# Patient Record
Sex: FEMALE | Race: WHITE | NOT HISPANIC OR LATINO | Employment: FULL TIME | URBAN - NONMETROPOLITAN AREA
[De-identification: names, ages, dates, MRNs, and addresses within clinical notes are randomized per-mention and may not be internally consistent; named-entity substitution may affect disease eponyms.]

---

## 2018-01-16 ENCOUNTER — HISTORY (OUTPATIENT)
Dept: FAMILY MEDICINE | Facility: OTHER | Age: 33
End: 2018-01-16

## 2018-01-16 ENCOUNTER — OFFICE VISIT - GICH (OUTPATIENT)
Dept: FAMILY MEDICINE | Facility: OTHER | Age: 33
End: 2018-01-16

## 2018-01-16 ENCOUNTER — HOSPITAL ENCOUNTER (OUTPATIENT)
Dept: RADIOLOGY | Facility: OTHER | Age: 33
End: 2018-01-16
Attending: NURSE PRACTITIONER

## 2018-01-16 DIAGNOSIS — M79.675 PAIN OF TOE OF LEFT FOOT: ICD-10-CM

## 2018-01-16 DIAGNOSIS — K21.9 GASTRO-ESOPHAGEAL REFLUX DISEASE WITHOUT ESOPHAGITIS: ICD-10-CM

## 2018-01-16 DIAGNOSIS — S99.922A INJURY OF LEFT FOOT: ICD-10-CM

## 2018-01-16 DIAGNOSIS — S92.515A CLOSED NONDISPLACED FRACTURE OF PROXIMAL PHALANX OF LESSER TOE OF LEFT FOOT: ICD-10-CM

## 2018-01-16 DIAGNOSIS — M79.672 PAIN OF LEFT FOOT: ICD-10-CM

## 2018-01-17 ENCOUNTER — AMBULATORY - GICH (OUTPATIENT)
Dept: SCHEDULING | Facility: OTHER | Age: 33
End: 2018-01-17

## 2018-01-26 ENCOUNTER — AMBULATORY - GICH (OUTPATIENT)
Dept: SCHEDULING | Facility: OTHER | Age: 33
End: 2018-01-26

## 2018-01-30 ENCOUNTER — DOCUMENTATION ONLY (OUTPATIENT)
Dept: FAMILY MEDICINE | Facility: OTHER | Age: 33
End: 2018-01-30

## 2018-01-30 PROBLEM — R87.619 ABNORMAL GLANDULAR PAPANICOLAOU SMEAR OF CERVIX: Status: ACTIVE | Noted: 2018-01-30

## 2018-01-30 PROBLEM — F17.200 NICOTINE ADDICTION: Status: ACTIVE | Noted: 2018-01-30

## 2018-01-30 RX ORDER — FLUCONAZOLE 150 MG/1
TABLET ORAL
COMMUNITY
Start: 2017-08-09 | End: 2018-03-15

## 2018-01-30 RX ORDER — LANSOPRAZOLE 30 MG/1
30 CAPSULE, DELAYED RELEASE ORAL DAILY
COMMUNITY
Start: 2017-07-31 | End: 2018-06-11

## 2018-01-30 RX ORDER — HYDROCODONE BITARTRATE AND ACETAMINOPHEN 5; 325 MG/1; MG/1
1 TABLET ORAL EVERY 4 HOURS PRN
COMMUNITY
Start: 2016-07-12 | End: 2018-03-15

## 2018-02-09 VITALS — DIASTOLIC BLOOD PRESSURE: 82 MMHG | HEART RATE: 64 BPM | SYSTOLIC BLOOD PRESSURE: 128 MMHG | WEIGHT: 172.4 LBS

## 2018-02-13 NOTE — PROGRESS NOTES
Patient Information     Patient Name MRN Sex Hansa Corrigan [id number] Female 1985      Progress Notes by Sybil Carolina NP at 2018  2:30 PM     Author:  Sybil Carolina NP Service:  (none) Author Type:  PHYS- Nurse Practitioner     Filed:  2018  4:16 PM Encounter Date:  2018 Status:  Signed     :  Sybil Carolina NP (PHYS- Nurse Practitioner)            HPI:    Hansa Dennis is a 32 y.o. female who presents to clinic today for foot injury.   States she stubbed her left toes yesterday.  States no bruising or swelling.  Pain persisting and radiating to her left foot and heel.  Mild numbness between the left 4th and 5th toes and the heel.  Worse with wearing shoes.  Pain worst with weight bearing and walking.  Taking Ibuprofen.  No icing.      She is also requesting a refill on her Prevacid.   States she tried OTC lansoprazole and Zantac without relief.  States GERD symptoms bothersome for the past week.  States foul taste in mouth, bad breath, and burning in chest.  States history of colitis with diarrhea, abdominal cramping and blood in stools, reports colonoscopy 3 years ago was normal.  States she occasionally still has symptoms flare for about 2-3 days with diarrhea, maroon colored blood in stools and intestinal cramping.  She is going to follow up with her PCP for repeat colonoscopy and evaluation of her colitis symptoms.          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       Social History       Substance Use Topics         Smoking  status:  Former Smoker      Packs/day: 0.50      Years: 10.00      Types: Cigarettes      Start date: 7/12/1998      Quit date: 6/12/2011      Smokeless tobacco:  Never Used      Alcohol use  0.0 oz/week     0 Standard drinks or equivalent per week      Current Outpatient Prescriptions       Medication  Sig Dispense Refill     fluconazole (DIFLUCAN) 150 mg tablet 150mg weekly x 6 weeks 6 tablet 0     HYDROcodone-acetaminophen, 5-325 mg, (NORCO) per tablet Take 1 tablet by mouth every 4 hours if needed. Max acetaminophen dose: 4000 mg in 24 hrs. 90 tablet 0     lansoprazole (PREVACID) 30 mg capsule Take 1 capsule by mouth once daily. 90 capsule 0     No current facility-administered medications for this visit.      Medications have been reviewed by me and are current to the best of my knowledge and ability.    No Known Allergies    Past medical history, past surgical history, current medications and allergies reviewed and accurate to the best of my knowledge.        ROS:  Refer to HPI    /82 (Cuff Site: Right Arm, Position: Sitting, Cuff Size: Adult Regular)  Pulse 64  Wt 78.2 kg (172 lb 6.4 oz)  LMP 01/02/2018  Breastfeeding? No    EXAM:  General Appearance: Well appearing adult female, appropriate appearance for age. No acute distress  Eyes: conjunctivae normal without erythema or irritation, no drainage or crusting, no eyelid swelling, pupils equal   Orophayrnx: moist mucous membranes, posterior pharynx with erythema, tonsils without hypertrophy, no erythema, no exudates or petechiae, no post nasal drip seen, no ulcers.    Neck: supple without adenopathy  Respiratory:  Normal effort.  No cough appreciated.  Cardiovascular:  CMS intact to left lower extremity, brisk capillary refill, strong pedal pulse   Musculoskeletal:  Left toes, foot, and ankle without visible swelling or deformity.  Left fifth toe with generalized tenderness to palpation.  Able to wiggle and move the left toes.  Left plantar foot and  heel with stated pain but non tender to palpation.  No tenderness over the left achilles.  No tenderness of the left ankle.  Normal gait.  Equal movement of bilateral upper extremities.  Equal movement of bilateral lower extremities.   Dermatological: skin intact to left lower extremity, no bruising, no petechiae, no erythema, no rash  Psychological: normal affect, alert and pleasant      Xray:  PROCEDURE:  XR TOES 3 VIEWS LEFT  HISTORY: Toe injury, left, initial encounter.  COMPARISON:  None.  TECHNIQUE:  3 views left fifth digit.  FINDINGS:  There is mild focal cortical irregularity within the proximal aspect of the proximal phalanx of the left fifth toe. A nondisplaced fracture in this location is not excluded. No other evidence of fracture is seen. No retained foreign body is present. Consider follow-up.  Electronically Signed By: Jose Lamar on 1/16/2018 3:49 PM          ASSESSMENT/PLAN:    ICD-10-CM    1. Closed nondisplaced fracture of proximal phalanx of lesser toe of left foot, initial encounter S92.515A    2. Toe injury, left, initial encounter S99.922A XR TOES 3 VIEWS LEFT   3. Acute pain of left foot M79.672 XR TOES 3 VIEWS LEFT   4. Toe pain, left M79.675 XR TOES 3 VIEWS LEFT   5. Gastroesophageal reflux disease, esophagitis presence not specified K21.9 lansoprazole (PREVACID) 30 mg capsule         Left toe pain, injury:  Xray of left fifth toe completed and reviewed, slight irregularity of the proximal phalanx, radiologist over read:  There is mild focal cortical irregularity within the proximal aspect of the proximal phalanx of the left fifth toe. A nondisplaced fracture in this location is not excluded. No other evidence of fracture is seen.   Encouraged reji tapping and hard soled shoes  WBAT  Activity as tolerated  ICE  Tylenol or ibuprofen PRN  Follow up for recheck in 2-4 weeks     GERD symptoms:  History of GERD and colitis symptoms, reports previous colonoscopy normal, due for repeat and to  see her PCP for check up but requesting refill today of her Prevacid.  Refilled Prevacid 30 mg daily (90, no refills).  Discussed need to follow up soon with her PCP for recheck of her GERD and colitis symptoms and management            Patient Instructions      Index Maltese   Plantar Fasciitis Exercises   Your healthcare provider may recommend exercises to help you heal. Talk to your healthcare provider or physical therapist about which exercises will best help you and how to do them correctly and safely. An over-the-counter shoe orthotic might also be helpful. You can buy these at an outdoor recreation store, shoe store, or large department store.  You may start strengthening the muscles of your hip and stretching the muscles of your foot right away.    Prone hip extension: Lie on your stomach with your legs straight out behind you. Fold your arms under your head and rest your head on your arms. Draw your belly button in towards your spine and tighten your abdominal muscles. Tighten the buttocks and thigh muscles of the leg on your injured side and lift the leg off the floor about 8 inches. Keep your leg straight. Hold for 5 seconds. Then lower your leg and relax. Do 2 sets of 15.    Side-lying leg lift: Lie on your uninjured side. Tighten the front thigh muscles on your injured leg and lift that leg 8 to 10 inches (20 to 25 centimeters) away from the other leg. Keep the leg straight and lower it slowly. Do 2 sets of 15.    Frozen can roll: Roll your bare injured foot back and forth from your heel to your mid-arch over a frozen juice can. Repeat for 3 to 5 minutes. This exercise is particularly helpful if it is done first thing in the morning.    Towel stretch: Sit on a hard surface with your injured leg stretched out in front of you. Loop a towel around your toes and the ball of your foot and pull the towel toward your body keeping your leg straight. Hold this position for 15 to 30 seconds and then relax. Repeat  3 times.    Standing calf stretch: Stand facing a wall with your hands on the wall at about eye level. Keep your injured leg back with your heel on the floor. Keep the other leg forward with the knee bent. Turn your back foot slightly inward (as if you were pigeon-toed). Slowly lean into the wall until you feel a stretch in the back of your calf. Hold the stretch for 15 to 30 seconds. Return to the starting position. Repeat 3 times. Do this exercise several times each day.    Seated plantar fascia stretch: Sit in a chair and cross the injured foot over the knee of your other leg. Place your fingers over the base of your toes and pull them back toward your shin until you feel a comfortable stretch in the arch of your foot. Hold 15 seconds and repeat 3 times.    Plantar fascia massage: Sit in a chair and cross the injured foot over the knee of your other leg. Place your fingers over the base of the toes of your injured foot and pull your toes toward your shin until you feel a stretch in the arch of your foot. With your other hand, massage the bottom of your foot, moving from the heel toward your toes. Do this for 3 to 5 minutes. Start gently. Press harder on the bottom of your foot as you become able to tolerate more pressure.    Achilles stretch: Stand with the ball of one foot on a stair. Reach for the step below with your heel until you feel a stretch in the arch of your foot. Hold this position for 15 to 30 seconds and then relax. Repeat 3 times.    Standing hamstring stretch: Put the heel of the leg on your injured side on a stool about 15 inches high. Keep your leg straight. Lean forward, bending at the hips, until you feel a mild stretch in the back of your thigh. Make sure you don't roll your shoulders or bend at the waist when doing this or you will stretch your lower back instead of your leg. Hold the stretch for 15 to 30 seconds. Repeat 3 times.  After you have stretched the bottom muscles of your foot, you  can do these exercises to start strengthening the muscles of your foot.    Towel pickup: With your heel on the ground,  a towel with your toes. Release. Do 3 sets of 20. When this gets easy, add more resistance by placing a book or small weight on the towel.    Balance and reach exercises: Stand next to a chair with your injured leg farther from the chair. The chair will provide support if you need it. Stand on the foot of your injured leg and bend your knee slightly.  1. With the hand that is farther away from the chair, reach forward in front of you by bending at the waist. Avoid bending your knee any more as you do this. Repeat this 15 times. To make the exercise more challenging, reach farther in front of you. Do 2 sets of 15.  2. Reach the hand that is farther away from the chair across your body toward the chair. The farther you reach, the more challenging the exercise. Do 2 sets of 15.    Heel raise: Stand behind a chair or counter with both feet flat on the floor. Using the chair or counter as a support, rise up onto your toes and hold for 5 seconds. Then slowly lower yourself down without holding onto the support. (It's OK to keep holding onto the support if you need to.) When this exercise becomes less painful, try doing this exercise while you are standing on the injured leg only. Repeat 15 times. Do 2 sets of 15. Rest 30 seconds between sets.  Developed by [employer].  Adult Advisor 2017.2 published by [employer].  Last modified: 2016-05-25  Last reviewed: 2016-05-25  This content is reviewed periodically and is subject to change as new health information becomes available. The information is intended to inform and educate and is not a replacement for medical evaluation, advice, diagnosis or treatment by a healthcare professional.  References   Adult Advisor 2017.2 Index    Copyright   2017 [employer], a division of [employer] All rights reserved.

## 2018-02-13 NOTE — PATIENT INSTRUCTIONS
Patient Information     Patient Name MRN Sex Hansa Corrigan [id number] Female 1985      Patient Instructions by Sybil Carolina NP at 2018  3:35 PM     Author:  Sybil Carolina NP  Service:  (none) Author Type:  PHYS- Nurse Practitioner     Filed:  2018  3:35 PM  Encounter Date:  2018 Status:  Addendum     :  Sybil Carolina NP (PHYS- Nurse Practitioner)        Related Notes: Original Note by Sybil Carolina NP (PHYS- Nurse Practitioner) filed at 2018  3:35 PM               Index Croatian   Plantar Fasciitis Exercises   Your healthcare provider may recommend exercises to help you heal. Talk to your healthcare provider or physical therapist about which exercises will best help you and how to do them correctly and safely. An over-the-counter shoe orthotic might also be helpful. You can buy these at an outdoor recreation store, shoe store, or large department store.  You may start strengthening the muscles of your hip and stretching the muscles of your foot right away.    Prone hip extension: Lie on your stomach with your legs straight out behind you. Fold your arms under your head and rest your head on your arms. Draw your belly button in towards your spine and tighten your abdominal muscles. Tighten the buttocks and thigh muscles of the leg on your injured side and lift the leg off the floor about 8 inches. Keep your leg straight. Hold for 5 seconds. Then lower your leg and relax. Do 2 sets of 15.    Side-lying leg lift: Lie on your uninjured side. Tighten the front thigh muscles on your injured leg and lift that leg 8 to 10 inches (20 to 25 centimeters) away from the other leg. Keep the leg straight and lower it slowly. Do 2 sets of 15.    Frozen can roll: Roll your bare injured foot back and forth from your heel to your mid-arch over a frozen juice can. Repeat for 3 to 5 minutes. This exercise is particularly helpful if it is done first thing in the  morning.    Towel stretch: Sit on a hard surface with your injured leg stretched out in front of you. Loop a towel around your toes and the ball of your foot and pull the towel toward your body keeping your leg straight. Hold this position for 15 to 30 seconds and then relax. Repeat 3 times.    Standing calf stretch: Stand facing a wall with your hands on the wall at about eye level. Keep your injured leg back with your heel on the floor. Keep the other leg forward with the knee bent. Turn your back foot slightly inward (as if you were pigeon-toed). Slowly lean into the wall until you feel a stretch in the back of your calf. Hold the stretch for 15 to 30 seconds. Return to the starting position. Repeat 3 times. Do this exercise several times each day.    Seated plantar fascia stretch: Sit in a chair and cross the injured foot over the knee of your other leg. Place your fingers over the base of your toes and pull them back toward your shin until you feel a comfortable stretch in the arch of your foot. Hold 15 seconds and repeat 3 times.    Plantar fascia massage: Sit in a chair and cross the injured foot over the knee of your other leg. Place your fingers over the base of the toes of your injured foot and pull your toes toward your shin until you feel a stretch in the arch of your foot. With your other hand, massage the bottom of your foot, moving from the heel toward your toes. Do this for 3 to 5 minutes. Start gently. Press harder on the bottom of your foot as you become able to tolerate more pressure.    Achilles stretch: Stand with the ball of one foot on a stair. Reach for the step below with your heel until you feel a stretch in the arch of your foot. Hold this position for 15 to 30 seconds and then relax. Repeat 3 times.    Standing hamstring stretch: Put the heel of the leg on your injured side on a stool about 15 inches high. Keep your leg straight. Lean forward, bending at the hips, until you feel a mild  stretch in the back of your thigh. Make sure you don't roll your shoulders or bend at the waist when doing this or you will stretch your lower back instead of your leg. Hold the stretch for 15 to 30 seconds. Repeat 3 times.  After you have stretched the bottom muscles of your foot, you can do these exercises to start strengthening the muscles of your foot.    Towel pickup: With your heel on the ground,  a towel with your toes. Release. Do 3 sets of 20. When this gets easy, add more resistance by placing a book or small weight on the towel.    Balance and reach exercises: Stand next to a chair with your injured leg farther from the chair. The chair will provide support if you need it. Stand on the foot of your injured leg and bend your knee slightly.  1. With the hand that is farther away from the chair, reach forward in front of you by bending at the waist. Avoid bending your knee any more as you do this. Repeat this 15 times. To make the exercise more challenging, reach farther in front of you. Do 2 sets of 15.  2. Reach the hand that is farther away from the chair across your body toward the chair. The farther you reach, the more challenging the exercise. Do 2 sets of 15.    Heel raise: Stand behind a chair or counter with both feet flat on the floor. Using the chair or counter as a support, rise up onto your toes and hold for 5 seconds. Then slowly lower yourself down without holding onto the support. (It's OK to keep holding onto the support if you need to.) When this exercise becomes less painful, try doing this exercise while you are standing on the injured leg only. Repeat 15 times. Do 2 sets of 15. Rest 30 seconds between sets.  Developed by [employer].  Adult Advisor 2017.2 published by [employer].  Last modified: 2016-05-25  Last reviewed: 2016-05-25  This content is reviewed periodically and is subject to change as new health information becomes available. The information is intended to inform  and educate and is not a replacement for medical evaluation, advice, diagnosis or treatment by a healthcare professional.  References   Adult Advisor 2017.2 Index    Copyright   2017 [employer], a division of [employer] All rights reserved.

## 2018-02-13 NOTE — NURSING NOTE
Patient Information     Patient Name MRN Sex Hansa Corrigan [id number] Female 1985      Nursing Note by Geraldine Shetty at 2018  2:30 PM     Author:  Geraldine Shetty Service:  (none) Author Type:  (none)     Filed:  2018  2:57 PM Encounter Date:  2018 Status:  Signed     :  Geraldine Shetty            Patient presents today for left foot injury. She accidental hit her foot on the underneath of her bed yesterday. Foot didn't swell up or bruise, but it is really painful.     Geraldine Shetty LPRICHIE..............2018 2:52 PM

## 2018-03-15 ENCOUNTER — OFFICE VISIT (OUTPATIENT)
Dept: FAMILY MEDICINE | Facility: OTHER | Age: 33
End: 2018-03-15
Attending: NURSE PRACTITIONER
Payer: COMMERCIAL

## 2018-03-15 VITALS
TEMPERATURE: 98.2 F | SYSTOLIC BLOOD PRESSURE: 124 MMHG | WEIGHT: 170 LBS | DIASTOLIC BLOOD PRESSURE: 88 MMHG | HEART RATE: 64 BPM

## 2018-03-15 DIAGNOSIS — Z12.4 CERVICAL CANCER SCREENING: ICD-10-CM

## 2018-03-15 DIAGNOSIS — J34.89 LESION OF NOSE: ICD-10-CM

## 2018-03-15 DIAGNOSIS — R10.2 PELVIC PAIN IN FEMALE: Primary | ICD-10-CM

## 2018-03-15 DIAGNOSIS — Z00.00 ROUTINE GENERAL MEDICAL EXAMINATION AT A HEALTH CARE FACILITY: ICD-10-CM

## 2018-03-15 DIAGNOSIS — Z80.41 FAMILY HISTORY OF MALIGNANT NEOPLASM OF OVARY: ICD-10-CM

## 2018-03-15 DIAGNOSIS — Z80.8 FAMILY HISTORY OF SKIN CANCER: ICD-10-CM

## 2018-03-15 DIAGNOSIS — Z87.42 HISTORY OF OVARIAN CYST: ICD-10-CM

## 2018-03-15 PROCEDURE — 88142 CYTOPATH C/V THIN LAYER: CPT | Mod: ZL | Performed by: NURSE PRACTITIONER

## 2018-03-15 PROCEDURE — G0123 SCREEN CERV/VAG THIN LAYER: HCPCS | Performed by: NURSE PRACTITIONER

## 2018-03-15 PROCEDURE — 99395 PREV VISIT EST AGE 18-39: CPT | Mod: 25 | Performed by: NURSE PRACTITIONER

## 2018-03-15 PROCEDURE — 87624 HPV HI-RISK TYP POOLED RSLT: CPT | Performed by: NURSE PRACTITIONER

## 2018-03-15 ASSESSMENT — PAIN SCALES - GENERAL: PAINLEVEL: NO PAIN (0)

## 2018-03-15 ASSESSMENT — ENCOUNTER SYMPTOMS
ABDOMINAL PAIN: 1
ABDOMINAL DISTENTION: 1

## 2018-03-15 NOTE — PATIENT INSTRUCTIONS
Recognizing Skin Cancer  Doing monthly skin checkups is the best way to find new marks or skin changes. During your skin checkups, be sure to follow the ABCDEs of skin checks. This means checking moles or other growths for Asymmetry, Border, Color, Diameter, and Evolving (changing). Note, too, any new growths, or, if any of your growths bleed, itch, look different, or are painful.  The ABCDEs of skin checks  Check your moles or growths for signs of melanoma using ABCDE:    Asymmetry: the sides of the mole or growth don t match    Border: the edges are ragged, notched, or blurred    Color: the color within the mole or growth varies    Diameter: the mole or growth is larger than 6 mm (size of a pencil eraser)    Evolving: the size, shape, or color of the mole or growth is changing (evolving is not shown below)       In addition to the ABCDEs, other warning signs of skin cancer include:    A spot or mole that looks different from all other marks on your skin    Changes in how an area feels, such as itching, tenderness, or pain    Changes in the skin's surface, such as oozing, bleeding, or scaliness    A sore that does not heal    New swelling or redness beyond the border of a mole  Who s at risk?  Anyone can get skin cancer. But you are at greater risk if you have:    Fair skin, light-colored hair, or light-colored eyes    Many moles or abnormal moles on your skin    A history of sunburns from sunlight or tanning beds    A family history of skin cancer    A history of exposure to radiation or chemicals    A weakened immune system  Also, a personal history of skin cancer puts you at risk for recurring skin cancer.  How to check your skin  Do your monthly skin checkups in front of a full-length mirror. Check all parts of your body, including your:    Head (ears, face, neck, and scalp)    Torso (front, back, and sides)    Arms (tops, undersides, upper, and lower armpits)    Hands (palms, backs, and fingers, including  under the nails)    Buttocks and genitals    Legs (front, back, and sides)    Feet (tops, soles, toes, including under the nails, and between toes)  If you have a lot of moles, take digital photos of them each month. Make sure to take photos both up close and from a distance. These can help you see if any moles change over time.  When to seek medical treatment  Most skin changes are not cancer. But if you see any changes in your skin, call your doctor right away. Only he or she can diagnose a problem. If you have skin cancer, seeing your doctor can be the first step toward getting the treatment that could save your life.   Date Last Reviewed: 1/1/2017 2000-2017 [employer]. [address] [place] [address], [place] [address]. All rights reserved. This information is not intended as a substitute for professional medical care. Always follow your healthcare professional's instructions.

## 2018-03-15 NOTE — MR AVS SNAPSHOT
After Visit Summary   3/15/2018    Hansa Dennis    MRN: [id number]           Patient Information     Date Of Birth          1985        Visit Information        Provider Department      3/15/2018 7:45 AM Alison Sheehan APRN [facility]        Today's Diagnoses     Pelvic pain in female    -  1    Family history of skin cancer        Lesion of nose        Cervical cancer screening        History of ovarian cyst        Family history of malignant neoplasm of ovary          Care Instructions      Recognizing Skin Cancer  Doing monthly skin checkups is the best way to find new marks or skin changes. During your skin checkups, be sure to follow the ABCDEs of skin checks. This means checking moles or other growths for Asymmetry, Border, Color, Diameter, and Evolving (changing). Note, too, any new growths, or, if any of your growths bleed, itch, look different, or are painful.  The ABCDEs of skin checks  Check your moles or growths for signs of melanoma using ABCDE:    Asymmetry: the sides of the mole or growth don t match    Border: the edges are ragged, notched, or blurred    Color: the color within the mole or growth varies    Diameter: the mole or growth is larger than 6 mm (size of a pencil eraser)    Evolving: the size, shape, or color of the mole or growth is changing (evolving is not shown below)       In addition to the ABCDEs, other warning signs of skin cancer include:    A spot or mole that looks different from all other marks on your skin    Changes in how an area feels, such as itching, tenderness, or pain    Changes in the skin's surface, such as oozing, bleeding, or scaliness    A sore that does not heal    New swelling or redness beyond the border of a mole  Who s at risk?  Anyone can get skin cancer. But you are at greater risk if you have:    Fair skin, light-colored hair, or light-colored eyes    Many moles or abnormal moles on your skin    A history of  sunburns from sunlight or tanning beds    A family history of skin cancer    A history of exposure to radiation or chemicals    A weakened immune system  Also, a personal history of skin cancer puts you at risk for recurring skin cancer.  How to check your skin  Do your monthly skin checkups in front of a full-length mirror. Check all parts of your body, including your:    Head (ears, face, neck, and scalp)    Torso (front, back, and sides)    Arms (tops, undersides, upper, and lower armpits)    Hands (palms, backs, and fingers, including under the nails)    Buttocks and genitals    Legs (front, back, and sides)    Feet (tops, soles, toes, including under the nails, and between toes)  If you have a lot of moles, take digital photos of them each month. Make sure to take photos both up close and from a distance. These can help you see if any moles change over time.  When to seek medical treatment  Most skin changes are not cancer. But if you see any changes in your skin, call your doctor right away. Only he or she can diagnose a problem. If you have skin cancer, seeing your doctor can be the first step toward getting the treatment that could save your life.   Date Last Reviewed: 1/1/2017 2000-2017 [employer]. [address] [place] [address], [place] [address]. All rights reserved. This information is not intended as a substitute for professional medical care. Always follow your healthcare professional's instructions.                Follow-ups after your visit        Additional Services     DERMATOLOGY REFERRAL       Provider of choice  If covered dermatology professionals            OB/GYN REFERRAL       GYN to discuss dysmenrohea and intermittent pelvic pain--has tried SSRI--provider of choice                  Follow-up notes from your care team     Return if symptoms worsen or fail to improve.      Future tests that were ordered for you today     Open Future Orders        Priority Expected Expires Ordered  "   US Pelvic Complete with Transvaginal Routine  3/15/2019 3/15/2018            Who to contact     If you have questions or need follow up information about today's clinic visit or your schedule please contact [facility] directly at [phone].  Normal or non-critical lab and imaging results will be communicated to you by [employer], letter or phone within 4 business days after the clinic has received the results. If you do not hear from us within 7 days, please contact the clinic through [employer] or phone. If you have a critical or abnormal lab result, we will notify you by phone as soon as possible.  Submit refill requests through [employer] or call your pharmacy and they will forward the refill request to us. Please allow 3 business days for your refill to be completed.          Additional Information About Your Visit        MyChart Information     [employer] lets you send messages to your doctor, view your test results, renew your prescriptions, schedule appointments and more. To sign up, go to [email or web address] . Click on \"Log in\" on the left side of the screen, which will take you to the Welcome page. Then click on \"Sign up Now\" on the right side of the page.     You will be asked to enter the access code listed below, as well as some personal information. Please follow the directions to create your username and password.     Your access code is: [email or web address]  Expires: 2018  8:52 AM     Your access code will  in 90 days. If you need help or a new code, please call your [place] clinic or [phone].        Care EveryWhere ID     This is your Care EveryWhere ID. This could be used by other organizations to access your [place] medical records  [id number]        Your Vitals Were     Pulse Temperature Last Period Breastfeeding?          64 98.2  F (36.8  C) (Temporal) 2018 No         Blood Pressure from Last 3 Encounters:   03/15/18 124/88   18 128/82    Weight from " Last 3 Encounters:   03/15/18 170 lb (77.1 kg)   01/16/18 172 lb 6.4 oz (78.2 kg)              We Performed the Following     DERMATOLOGY REFERRAL     HPV High Risk Types DNA Cervical     OB/GYN REFERRAL     Pap Screen Thin Prep with HPV - recommended age 30 - 65 years (select HPV order below)        Primary Care Provider Office Phone # Fax #    [facility] [phone] [address]  [facility] [address]        Equal Access to Services     CLARE CHAN : Hadii aad ku hadasho Soomaali, waaxda luqadaha, qaybta kaalmada adeegyada, waxay elisabetin kamaljit valdez. So [facility] [phone].    ATENCIÓN: Si habla español, tiene a miramontes disposición servicios gratuitos de asistencia lingüística. [facility] [phone].    We comply with applicable federal civil rights laws and [place] laws. We do not discriminate on the basis of race, color, national origin, age, disability, sex, sexual orientation, or gender identity.            Thank you!     Thank you for choosing [facility]  for your care. Our goal is always to provide you with excellent care. Hearing back from our patients is one way we can continue to improve our services. Please take a few minutes to complete the written survey that you may receive in the mail after your visit with us. Thank you!             Your Updated Medication List - Protect others around you: Learn how to safely use, store and throw away your medicines at www.disposemymeds.org.          This list is accurate as of 3/15/18  8:53 AM.  Always use your most recent med list.                   Brand Name Dispense Instructions for use Diagnosis    LANsoprazole 30 MG CR capsule    PREVACID     Take 30 mg by mouth daily

## 2018-03-15 NOTE — PROGRESS NOTES
SUBJECTIVE:   Hansa Dennis is a 32 year old female who presents to clinic today for the following health issues:  Presents for preventive screenings, physical and other issues.  Previous records at [facility].  After review in care everywhere last documented Pap 2010 negative.  Reports had a Pap when she was 16 years old remembers having cryotherapy treatment.  She feels she may have had a Pap within the last few years unable to find those records at this time.  Quit smoking in 2011.  Reports for the past year intermittently may be every other.  Notices where she feels bloated and distended and feels a pop sensation and has clear vaginal discharge.  When reviewing past records had an abdominal CT at [facility] 2014 noted 3 cm left ovarian cyst uncertain of follow-up  Reports a positive family history of ovarian cancer with her grandmother  Reports dysmenorrhea has become more bothersome over the past year usually the first several days before menses onset--has tried sertraline and not effective--does not want to pursue any further SSRIs    Does not use contraception-- has been getting testosterone injections regularly for years.    Has concerns about a solid papule on the left side of her nose that has been there for a year that will not resolve.  It is not painful, it is not pruritic there is not been any drainage however her grandfather was diagnosed with invasive squamous cell cancer and she would like to get this checked out by dermatology.  Also reports a past history of colitis and has had colonoscopy and endoscopically at [facility] in [place] and feels overall she is been able to manage this well with diet.  Reports overall health is been good--- uncertain of last tetanus vaccine.    HPI    Current Outpatient Prescriptions   Medication Sig Dispense Refill     LANsoprazole (PREVACID) 30 MG CR capsule Take 30 mg by mouth daily       No Known Allergies  BP Readings from Last 3 Encounters:   03/15/18  124/88   01/16/18 128/82    Wt Readings from Last 3 Encounters:   03/15/18 170 lb (77.1 kg)   01/16/18 172 lb 6.4 oz (78.2 kg)                  Labs reviewed in EPIC    Review of Systems   Gastrointestinal: Positive for abdominal distention and abdominal pain.   Genitourinary: Positive for pelvic pain.   Skin: Positive for rash.   All other systems reviewed and are negative.       OBJECTIVE:     /88 (BP Location: Right arm, Patient Position: Sitting, Cuff Size: Adult Large)  Pulse 64  Temp 98.2  F (36.8  C) (Temporal)  Wt 170 lb (77.1 kg)  LMP 02/25/2018  Breastfeeding? No  There is no height or weight on file to calculate BMI.  Physical Exam   Constitutional: She is oriented to person, place, and time. She appears well-developed and well-nourished.   HENT:   Head: Normocephalic and atraumatic.   Mouth/Throat: Oropharynx is clear and moist.   Neck: Normal range of motion. Neck supple.   Cardiovascular: Normal rate and regular rhythm.    Pulmonary/Chest: Effort normal and breath sounds normal.   Abdominal: Soft. Bowel sounds are normal. She exhibits no mass. There is tenderness. There is no rebound and no guarding.   Genitourinary:   Genitourinary Comments: Pelvic exam negative for any visible lesions, bleeding, discharge  Bilateral adnexal tenderness with bimanual exam, unable to feel any masses  Pap obtained   Musculoskeletal: Normal range of motion.   Lymphadenopathy:     She has no cervical adenopathy.   Neurological: She is alert and oriented to person, place, and time.   Skin: Skin is warm and dry.   Left distal end of her nose there is a 3 mm erythemic papule, feels firm and cystic, I do not feel any bogginess there is no surrounding edema no scale and no drainage or pustules   Psychiatric: She has a normal mood and affect. Her behavior is normal. Judgment and thought content normal.   Nursing note and vitals reviewed.          ASSESSMENT/PLAN:     #1  Family history of invasive squamous cell skin  cancer--- has had erythemic papule unchanged for over a year has not responded to over-the-counter therapies--- would like dermatology evaluation  We will refer to dermatology provider of choice--- she will make decision based on insurance network    #2  Reports chronic intermittent history of lower abdominal bilateral upper pelvic tenderness has happened intermittently once a month not every month with vaginal discharge and a family history of ovarian cancer and positive history of ovarian cyst  Will obtain pelvic ultrasound, Pap results pending  Referral to gynecology for follow-up     #3 dysmenorrhea--- would like to discuss recommendations with gynecology--- referral pending            SARAH Davis [facility]

## 2018-03-15 NOTE — NURSING NOTE
Patient is here today for a yearly physical and pap, she said she has been having some intermittent abd. Pain, she said it has been ongoing for years, more so in the last year. Teresa Schmitt LPN......................3/15/2018 7:59 AM

## 2018-03-16 ASSESSMENT — PATIENT HEALTH QUESTIONNAIRE - PHQ9: SUM OF ALL RESPONSES TO PHQ QUESTIONS 1-9: 0

## 2018-03-21 ENCOUNTER — HOSPITAL ENCOUNTER (OUTPATIENT)
Dept: ULTRASOUND IMAGING | Facility: OTHER | Age: 33
Discharge: HOME | End: 2018-03-21
Attending: NURSE PRACTITIONER | Admitting: NURSE PRACTITIONER
Payer: COMMERCIAL

## 2018-03-21 DIAGNOSIS — R10.2 PELVIC PAIN IN FEMALE: ICD-10-CM

## 2018-03-21 DIAGNOSIS — Z87.42 HISTORY OF OVARIAN CYST: ICD-10-CM

## 2018-03-21 PROCEDURE — 76856 US EXAM PELVIC COMPLETE: CPT

## 2018-03-22 LAB
FINAL DIAGNOSIS: NORMAL
HPV HR 12 DNA CVX QL NAA+PROBE: NEGATIVE
HPV16 DNA SPEC QL NAA+PROBE: NEGATIVE
HPV18 DNA SPEC QL NAA+PROBE: NEGATIVE
SPECIMEN DESCRIPTION: NORMAL
SPECIMEN SOURCE CVX/VAG CYTO: NORMAL

## 2018-04-03 ENCOUNTER — OFFICE VISIT (OUTPATIENT)
Dept: OBGYN | Facility: OTHER | Age: 33
End: 2018-04-03
Attending: NURSE PRACTITIONER
Payer: COMMERCIAL

## 2018-04-03 VITALS — SYSTOLIC BLOOD PRESSURE: 140 MMHG | HEART RATE: 72 BPM | DIASTOLIC BLOOD PRESSURE: 90 MMHG

## 2018-04-03 DIAGNOSIS — F32.81 PREMENSTRUAL DYSPHORIC SYNDROME: ICD-10-CM

## 2018-04-03 DIAGNOSIS — N95.1 SYMPTOMATIC MENOPAUSAL OR FEMALE CLIMACTERIC STATES: Primary | ICD-10-CM

## 2018-04-03 PROCEDURE — 99204 OFFICE O/P NEW MOD 45 MIN: CPT | Performed by: OBSTETRICS & GYNECOLOGY

## 2018-04-03 RX ORDER — SERTRALINE HYDROCHLORIDE 100 MG/1
100 TABLET, FILM COATED ORAL DAILY
Qty: 90 TABLET | Refills: 3 | Status: SHIPPED | OUTPATIENT
Start: 2018-04-03 | End: 2019-08-29

## 2018-04-03 ASSESSMENT — PAIN SCALES - GENERAL: PAINLEVEL: NO PAIN (0)

## 2018-04-03 ASSESSMENT — ANXIETY QUESTIONNAIRES
2. NOT BEING ABLE TO STOP OR CONTROL WORRYING: NOT AT ALL
IF YOU CHECKED OFF ANY PROBLEMS ON THIS QUESTIONNAIRE, HOW DIFFICULT HAVE THESE PROBLEMS MADE IT FOR YOU TO DO YOUR WORK, TAKE CARE OF THINGS AT HOME, OR GET ALONG WITH OTHER PEOPLE: SOMEWHAT DIFFICULT
1. FEELING NERVOUS, ANXIOUS, OR ON EDGE: SEVERAL DAYS
7. FEELING AFRAID AS IF SOMETHING AWFUL MIGHT HAPPEN: NOT AT ALL
6. BECOMING EASILY ANNOYED OR IRRITABLE: SEVERAL DAYS
4. TROUBLE RELAXING: SEVERAL DAYS
GAD7 TOTAL SCORE: 3
3. WORRYING TOO MUCH ABOUT DIFFERENT THINGS: NOT AT ALL
5. BEING SO RESTLESS THAT IT IS HARD TO SIT STILL: NOT AT ALL

## 2018-04-03 NOTE — MR AVS SNAPSHOT
After Visit Summary   4/3/2018    Hansa Dennis    MRN: [id number]           Patient Information     Date Of Birth          1985        Visit Information        Provider Department      4/3/2018 1:45 PM Noel Clark MD [facility]        Today's Diagnoses     Symptomatic menopausal or female climacteric states    -  1    Premenstrual dysphoric syndrome           Follow-ups after your visit        Follow-up notes from your care team     Return in about 2 weeks (around 4/17/2018) for Routine Visit.      Who to contact     If you have questions or need follow up information about today's clinic visit or your schedule please contact [facility] directly at [phone].  Normal or non-critical lab and imaging results will be communicated to you by [employer], letter or phone within 4 business days after the clinic has received the results. If you do not hear from us within 7 days, please contact the clinic through [employer] or phone. If you have a critical or abnormal lab result, we will notify you by phone as soon as possible.  Submit refill requests through [employer] or call your pharmacy and they will forward the refill request to us. Please allow 3 business days for your refill to be completed.          Additional Information About Your Visit        MyChart Information     [employer] gives you secure access to your electronic health record. If you see a primary care provider, you can also send messages to your care team and make appointments. If you have questions, please call your primary care clinic.  If you do not have a primary care provider, please call [phone] and they will assist you.        Care EveryWhere ID     This is your Care EveryWhere ID. This could be used by other organizations to access your [place] medical records  [id number]        Your Vitals Were     Pulse Last Period Breastfeeding?             72 03/29/2018 No          Blood  Pressure from Last 3 Encounters:   04/03/18 140/90   03/15/18 124/88   01/16/18 128/82    Weight from Last 3 Encounters:   03/15/18 77.1 kg (170 lb)   01/16/18 78.2 kg (172 lb 6.4 oz)              Today, you had the following     No orders found for display         Today's Medication Changes          These changes are accurate as of 4/3/18 11:59 PM.  If you have any questions, ask your nurse or doctor.               Start taking these medicines.        Dose/Directions    sertraline 100 MG tablet   Commonly known as:  ZOLOFT   Used for:  Symptomatic menopausal or female climacteric states   Started by:  Noel Clark MD        Dose:  100 mg   Take 1 tablet (100 mg) by mouth daily   Quantity:  90 tablet   Refills:  3            Where to get your medicines      These medications were sent to [facility] [address] [place], [place] - [address] AT SEC of [facility] [address]  [address], [facility] [address]     Phone:  [phone]     sertraline 100 MG tablet                Primary Care Provider Office Phone # Fax #    [facility] [phone]       [address]  [facility] [address]        Equal Access to Services     CLARE CHAN AH: Leia biswaso Soricarda, waaxda luqadaha, qaybta kaalmada adeegyada, buck valdez. So [facility] [phone].    ATENCIÓN: Si habla español, tiene a miramontes disposición servicios gratuitos de asistencia lingüística. Llame al [phone].    We comply with applicable federal civil rights laws and [place] laws. We do not discriminate on the basis of race, color, national origin, age, disability, sex, sexual orientation, or gender identity.            Thank you!     Thank you for choosing [facility]  for your care. Our goal is always to provide you with excellent care. Hearing back from our patients is one way we can continue to improve our services. Please take a few minutes to complete the  written survey that you may receive in the mail after your visit with us. Thank you!             Your Updated Medication List - Protect others around you: Learn how to safely use, store and throw away your medicines at www.disposemymeds.org.          This list is accurate as of 4/3/18 11:59 PM.  Always use your most recent med list.                   Brand Name Dispense Instructions for use Diagnosis    LANsoprazole 30 MG CR capsule    PREVACID     Take 30 mg by mouth daily        sertraline 100 MG tablet    ZOLOFT    90 tablet    Take 1 tablet (100 mg) by mouth daily    Symptomatic menopausal or female climacteric states

## 2018-04-03 NOTE — NURSING NOTE
"Patient presents to the clinic for a consult regarding Dysmenrohea and pelvic pain. Patient states every couple months she has sever pain in her ovary area but the ultrasound showed no cysts. Patient's  states she will be a \"bear\" the week before her period, but is good once she has her period.  Boris Rogers ..............4/3/2018 1:45 PM    "

## 2018-04-03 NOTE — LETTER
4/3/2018       RE: Hansa Dennis  [address] [facility] [address]     Dear Colleague,    Thank you for referring your patient, Hansa Dennis, to the [facility] at [facility]. Please see a copy of my visit note below.    HPI Comments: Hansa describes lifelong issue of premenstrual mood swings and irritability, anxiety/tension, depression and diminished interest in activities.  She is accompanied by her  today who confirms her her premenstrual symptoms.  She denies any irregularity in her menstrual periods and her normal flow lasts 4-5 days.  She has tried birth control pills in the past but couldn't remember to take them everyday.  She admits that she only reluctantly takes medications and actively avoids them.    Vaginal Problem    This is a chronic problem. Episode frequency: with menstual bleeding. Pertinent negatives include no dyspareunia and no dysuria. She has tried NSAIDs and a heating pad for the symptoms. Her past medical history does not include irregular periods, PID, STD, ectopic pregnancy, ovarian cysts or infertility.     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     Social History     Social History     Marital status:      Spouse name: N/A     Number of children: N/A     Years of education: N/A     Occupational History     Not on file.     Social History Main Topics     Smoking status: Former Smoker      "Packs/day: 0.50     Years: 10.00     Types: Cigarettes     Start date: 7/12/1998     Quit date: 6/12/2011     Smokeless tobacco: Never Used     Alcohol use 0.0 oz/week     Drug use: Not on file      Comment: Drug use: No     Sexual activity: Yes     Partners: Male     Birth control/ protection: None      Comment:  testosterone inj     Other Topics Concern     Not on file     Social History Narrative    Works at the [employer] in [place]  (2016) Mariella daughter is 13, other daughter is 3       Review of Systems     Genitourinary:  Positive for vaginal discharge. Negative for dysuria, urgency, hematuria, flank pain, dyspareunia, abnormal vaginal bleeding, excessive menstruation and menstrual changes.   Psychiatric/Behavioral:  Positive for decreased concentration and mood swings.      Physical Exam   Constitutional: She is oriented to person, place, and time and well-developed, well-nourished, and in no distress.   HENT:   Head: Normocephalic and atraumatic.   Eyes: Pupils are equal, round, and reactive to light.   Genitourinary:   Genitourinary Comments: Deferred due to normal pelvic u/s.   Neurological: She is alert and oriented to person, place, and time.   Skin: Skin is warm and dry.   Psychiatric: Mood, memory, affect and judgment normal.     Impression: Premenstrual Syndrome, possible Premenstrual Mood Dysphoric Disorder (PMDD)    Plan: Hansa is extremely reluctant to try an SSRI (\"crazy pill\") as she has multiple family members on SSRIs and does want to be like them.  We spent 20 minutes discussing her diagnosis which seems fairly certain.  With convincing, she will try Zoloft 50 mg a day and I've asked her to give this a 3 month trial.  In addition, I've asked her to check in with her immediate family members to see if they note a difference in her symptoms at the end of the 3 month trial.  She again, reluctantly agrees.  She'll f/u in 2 weeks to assess for side effects.  We did review the most " common side effects from the PDR.      Again, thank you for allowing me to participate in the care of your patient.      Sincerely,    SCOTT DELGADILLO MD

## 2018-04-03 NOTE — PROGRESS NOTES
HPI Comments: Hansa describes lifelong issue of premenstrual mood swings and irritability, anxiety/tension, depression and diminished interest in activities.  She is accompanied by her  today who confirms her her premenstrual symptoms.  She denies any irregularity in her menstrual periods and her normal flow lasts 4-5 days.  She has tried birth control pills in the past but couldn't remember to take them everyday.  She admits that she only reluctantly takes medications and actively avoids them.    Vaginal Problem    This is a chronic problem. Episode frequency: with menstual bleeding. Pertinent negatives include no dyspareunia and no dysuria. She has tried NSAIDs and a heating pad for the symptoms. Her past medical history does not include irregular periods, PID, STD, ectopic pregnancy, ovarian cysts or infertility.     Past Medical History:   Diagnosis Date     Abnormal cytological finding in specimen from cervix uteri     2007,treated with cryo, all normal since     Encounter for screening for malignant neoplasm of cervix     5/2015,PAP NIL (no HPV done d/t age), repeat due 2018     Past Surgical History:   Procedure Laterality Date     COLONOSCOPY      2014,repeat at age 40     OTHER SURGICAL HISTORY      41901.0,PAST SURGICAL HISTORY,Jaw repair~09/24/02 Colposcopy ~07/05/05 Colposcopy, showing squamous dysplasia, CIN1~08/24/05 Cryotherapy of the cervix.     Social History     Social History     Marital status:      Spouse name: N/A     Number of children: N/A     Years of education: N/A     Occupational History     Not on file.     Social History Main Topics     Smoking status: Former Smoker     Packs/day: 0.50     Years: 10.00     Types: Cigarettes     Start date: 7/12/1998     Quit date: 6/12/2011     Smokeless tobacco: Never Used     Alcohol use 0.0 oz/week     Drug use: Not on file      Comment: Drug use: No     Sexual activity: Yes     Partners: Male     Birth control/ protection: None       "Comment:  testosterone inj     Other Topics Concern     Not on file     Social History Narrative    Works at the [employer] in [place]  (2016) Mariella daughter is 13, other daughter is 3       Review of Systems     Genitourinary:  Positive for vaginal discharge. Negative for dysuria, urgency, hematuria, flank pain, dyspareunia, abnormal vaginal bleeding, excessive menstruation and menstrual changes.   Psychiatric/Behavioral:  Positive for decreased concentration and mood swings.      Physical Exam   Constitutional: She is oriented to person, place, and time and well-developed, well-nourished, and in no distress.   HENT:   Head: Normocephalic and atraumatic.   Eyes: Pupils are equal, round, and reactive to light.   Genitourinary:   Genitourinary Comments: Deferred due to normal pelvic u/s.   Neurological: She is alert and oriented to person, place, and time.   Skin: Skin is warm and dry.   Psychiatric: Mood, memory, affect and judgment normal.     Impression: Premenstrual Syndrome, possible Premenstrual Mood Dysphoric Disorder (PMDD)    Plan: Hansa is extremely reluctant to try an SSRI (\"crazy pill\") as she has multiple family members on SSRIs and does want to be like them.  We spent 20 minutes discussing her diagnosis which seems fairly certain.  With convincing, she will try Zoloft 50 mg a day and I've asked her to give this a 3 month trial.  In addition, I've asked her to check in with her immediate family members to see if they note a difference in her symptoms at the end of the 3 month trial.  She again, reluctantly agrees.  She'll f/u in 2 weeks to assess for side effects.  We did review the most common side effects from the PDR.    "

## 2018-04-04 ASSESSMENT — ANXIETY QUESTIONNAIRES: GAD7 TOTAL SCORE: 3

## 2018-04-04 ASSESSMENT — ENCOUNTER SYMPTOMS
NERVOUS/ANXIOUS: 1
DECREASED CONCENTRATION: 1
HEMATURIA: 0
FLANK PAIN: 0
DYSURIA: 0

## 2018-04-05 ENCOUNTER — TRANSFERRED RECORDS (OUTPATIENT)
Dept: HEALTH INFORMATION MANAGEMENT | Facility: OTHER | Age: 33
End: 2018-04-05

## 2018-06-11 DIAGNOSIS — K21.9 GASTROESOPHAGEAL REFLUX DISEASE, ESOPHAGITIS PRESENCE NOT SPECIFIED: Primary | ICD-10-CM

## 2018-06-13 RX ORDER — LANSOPRAZOLE 30 MG/1
CAPSULE, DELAYED RELEASE ORAL
Qty: 90 CAPSULE | Refills: 2 | Status: SHIPPED | OUTPATIENT
Start: 2018-06-13 | End: 2019-02-06

## 2018-06-13 NOTE — TELEPHONE ENCOUNTER
Last OV and physical with PCP was on 3/15/18. Prescription approved per [facility] Refill Protocol for 90 days and 2 refills at this time. Wendy Mack RN .............. 6/13/2018  4:28 PM

## 2019-02-06 DIAGNOSIS — K21.9 GASTROESOPHAGEAL REFLUX DISEASE, ESOPHAGITIS PRESENCE NOT SPECIFIED: Primary | ICD-10-CM

## 2019-02-06 RX ORDER — LANSOPRAZOLE 30 MG/1
CAPSULE, DELAYED RELEASE ORAL
Qty: 90 CAPSULE | Refills: 2 | Status: SHIPPED | OUTPATIENT
Start: 2019-02-06 | End: 2020-02-17

## 2019-04-24 ENCOUNTER — TELEPHONE (OUTPATIENT)
Dept: FAMILY MEDICINE | Facility: OTHER | Age: 34
End: 2019-04-24

## 2019-04-24 DIAGNOSIS — J02.0 STREPTOCOCCAL PHARYNGITIS: Primary | ICD-10-CM

## 2019-04-24 DIAGNOSIS — Z20.818 EXPOSURE TO STREP THROAT: ICD-10-CM

## 2019-04-24 RX ORDER — AMOXICILLIN 875 MG
875 TABLET ORAL 2 TIMES DAILY
Qty: 20 TABLET | Refills: 0 | Status: SHIPPED | OUTPATIENT
Start: 2019-04-24 | End: 2019-08-29

## 2019-04-24 NOTE — TELEPHONE ENCOUNTER
Relayed message that prescription is at [facility]. Libia Kaiser LPN on 4/24/2019 at 1:48 PM

## 2019-04-24 NOTE — TELEPHONE ENCOUNTER
Patient states her  and daughter have been diagnosed with strep and she feels like she is starting to come down with it. She is wondering if she can get amoxicillin or if she needs to come in to be seen. Thank you.

## 2019-08-29 ENCOUNTER — OFFICE VISIT (OUTPATIENT)
Dept: FAMILY MEDICINE | Facility: OTHER | Age: 34
End: 2019-08-29
Attending: FAMILY MEDICINE
Payer: COMMERCIAL

## 2019-08-29 VITALS
WEIGHT: 166 LBS | RESPIRATION RATE: 16 BRPM | BODY MASS INDEX: 29.41 KG/M2 | TEMPERATURE: 98.4 F | HEIGHT: 63 IN | SYSTOLIC BLOOD PRESSURE: 138 MMHG | DIASTOLIC BLOOD PRESSURE: 80 MMHG | HEART RATE: 62 BPM | OXYGEN SATURATION: 98 %

## 2019-08-29 DIAGNOSIS — R05.9 COUGH: Primary | ICD-10-CM

## 2019-08-29 PROCEDURE — 99213 OFFICE O/P EST LOW 20 MIN: CPT | Performed by: FAMILY MEDICINE

## 2019-08-29 RX ORDER — AZITHROMYCIN 250 MG/1
TABLET, FILM COATED ORAL
Qty: 6 TABLET | Refills: 0 | Status: SHIPPED | OUTPATIENT
Start: 2019-08-29 | End: 2019-09-03

## 2019-08-29 ASSESSMENT — PAIN SCALES - GENERAL: PAINLEVEL: MODERATE PAIN (5)

## 2019-08-29 ASSESSMENT — PATIENT HEALTH QUESTIONNAIRE - PHQ9: SUM OF ALL RESPONSES TO PHQ QUESTIONS 1-9: 5

## 2019-08-29 ASSESSMENT — MIFFLIN-ST. JEOR: SCORE: 1422.1

## 2019-08-29 NOTE — NURSING NOTE
"Chief Complaint   Patient presents with     Cough     cough x 3 weeks, now has bumps in back of throat.  fatigue       Initial /80 (BP Location: Right arm, Patient Position: Sitting, Cuff Size: Adult Regular)   Pulse 62   Temp 98.4  F (36.9  C) (Tympanic)   Resp 16   Ht 1.6 m (5' 3\")   Wt 75.3 kg (166 lb)   SpO2 98%   BMI 29.41 kg/m   Estimated body mass index is 29.41 kg/m  as calculated from the following:    Height as of this encounter: 1.6 m (5' 3\").    Weight as of this encounter: 75.3 kg (166 lb).  Medication Reconciliation: complete    Libia Rose LPN    "

## 2019-08-30 NOTE — PROGRESS NOTES
"Cough  SUBJECTIVE:   Hansa Dennis is a 34 year old female who presents to clinic today for the following health issues: Cough patient arrives here for cough.  She reports a cold about 3 weeks ago.   The cold has since resolved.  But she has persisted with a cough with a small amount of phlegm.  No fevers or chills.  Cough does seem to be getting worse.  She has no history of asthma.  She does occasionally smoke.        Patient Active Problem List    Diagnosis Date Noted     Cough 08/29/2019     Priority: Medium     Abnormal glandular Papanicolaou smear of cervix 01/30/2018     Priority: Medium     Overview:   persistent, recurrent,   CIN1 s/p cryotherapy       Nicotine addiction 01/30/2018     Priority: Medium     Colitis 06/23/2016     Priority: Medium     Rectal fissure 03/09/2010     Priority: Medium     Past Medical History:   Diagnosis Date     Abnormal cytological finding in specimen from cervix uteri     2007,treated with cryo, all normal since     Encounter for screening for malignant neoplasm of cervix     5/2015,PAP NIL (no HPV done d/t age), repeat due 2018      Past Surgical History:   Procedure Laterality Date     COLONOSCOPY      2014,repeat at age 40     OTHER SURGICAL HISTORY      82237.0,PAST SURGICAL HISTORY,Jaw repair~09/24/02 Colposcopy ~07/05/05 Colposcopy, showing squamous dysplasia, CIN1~08/24/05 Cryotherapy of the cervix.     No Known Allergies    Review of Systems     OBJECTIVE:     /80 (BP Location: Right arm, Patient Position: Sitting, Cuff Size: Adult Regular)   Pulse 62   Temp 98.4  F (36.9  C) (Tympanic)   Resp 16   Ht 1.6 m (5' 3\")   Wt 75.3 kg (166 lb)   SpO2 98%   BMI 29.41 kg/m     Body mass index is 29.41 kg/m .  Physical Exam   Constitutional: She appears well-developed.   HENT:   Head: Normocephalic.   Right Ear: External ear normal.   Left Ear: External ear normal.   Eyes: Pupils are equal, round, and reactive to light.   Pulmonary/Chest: Effort normal. She has " wheezes.           ASSESSMENT/PLAN:         1. Cough  Bronchitis.  Currently 3+ weeks.  Worsening.  Encourage smoking cessation.  Trial of Zithromax.Follow-up if not improving  - azithromycin (ZITHROMAX) 250 MG tablet; Take 2 tablets (500 mg) by mouth daily for 1 day, THEN 1 tablet (250 mg) daily for 4 days.  Dispense: 6 tablet; Refill: 0      Luis Antonio Lira MD  [facility]

## 2020-02-16 DIAGNOSIS — K21.9 GASTROESOPHAGEAL REFLUX DISEASE, ESOPHAGITIS PRESENCE NOT SPECIFIED: ICD-10-CM

## 2020-02-16 NOTE — LETTER
February 17, 2020      Hansa Dennis  [id number]  [place] [address]        Dear Hansa,         A refill of lansoprazole (Prevacid) 30 mg DR capsule has been requested by your pharmacy and it appears you are a former patient of lAison Sheehan APRN, NP.  As Alison is no longer with the clinic, we ask that you please contact [facility] for assistance in scheduling a medication management appointment with another one of our providers.    The refill request for this medication can then be discussed and addressed accordingly with your new primary care physician.  Your health is very important to us.  Please call the clinic at [phone] to schedule your appointment.    A limited 90 day kushal refill has been sent to your pharmacy to allow time to schedule your appointment.    Thank you for choosing [facility] and [facility] for your health care needs.    Sincerely,    Refill RN  [facility]

## 2020-02-17 RX ORDER — LANSOPRAZOLE 30 MG/1
CAPSULE, DELAYED RELEASE ORAL
Qty: 90 CAPSULE | Refills: 0 | Status: SHIPPED | OUTPATIENT
Start: 2020-02-17 | End: 2020-05-26

## 2020-02-17 NOTE — TELEPHONE ENCOUNTER
[facility] sent Rx request for the following:   LANsoprazole (PREVACID) 30 MG DR capsule  Si CAPSULE BY MOUTH EVERY DAY    Last Prescription Date:   2019  Last Fill Qty/Refills:         90, R-2    Last Office Visit:              2019-advised to continue requested medication  Future Office visit:           None    PPI Protocol Passed   Not on Clopidogrel (unless Pantoprazole ordered)    No diagnosis of osteoporosis on record    Recent (12 mo) or future (30 days) visit within the authorizing provider's specialty    Medication is active on med list    Patient is age 18 or older    No active pregnacy on record    No positive pregnancy test in past 12 months       Pt due for appt with new PCP.  Will send appt reminder letter, add note to pharm and fill limited supply.    Jenna Pelaez RN  ....................  2020   11:23 AM

## 2020-03-11 ENCOUNTER — HEALTH MAINTENANCE LETTER (OUTPATIENT)
Age: 35
End: 2020-03-11

## 2020-03-20 ENCOUNTER — NURSE TRIAGE (OUTPATIENT)
Dept: FAMILY MEDICINE | Facility: OTHER | Age: 35
End: 2020-03-20

## 2020-03-20 NOTE — TELEPHONE ENCOUNTER
"S-(situation): \"States she has symptoms for a fever of 99.3 and congestion, she was told to quarantine herself but is wanting to just discuss it with a nurse.\"    B-(background): Wanting to know about isolation.    A-(assessment): Low grade fever per patient, for a couple days, using tylenol, dry cough started yesterday. Voice raspy. Work telling patient she must isolate, patient just checking on what is being recommend for symptoms.    R-(recommendations): Recommend home to limit clinic visits r/t Covid 19. Call back if symptoms worsen. Go to ER/call 911 if having severe shortness of breath. Continue to use tylenol for fevers. If wanting to be seen will need to present to main clinic to be triaged. Recommend [email or web address]. Patient does not feel she needs to be seen.    Aleta Colón RN .............. 3/20/2020  11:24 AM        Please use the information at the end of this document to sign up for [employer] where you can get your results and a message about those results sent to you through the [employer] application. If you do not have [employer] we will call you with your results but it may take longer.    Regardless of if you have been tested or not:  Patient who have symptoms (cough, fever, or shortness of breath), need to isolate for 7 days from when symptoms started OR 72 hours after fever resolves (without fever reducing medications) AND improvement of respiratory symptoms (whichever is longer).      Isolate yourself at home (in own room/own bathroom if possible)    Do Not allow any visitors    Do Not go to work or school    Do Not go to [employer],  centers, shopping, or other public places.    Do Not shake hands.    Avoid close and intimate contact with others (hugging, kissing).    Follow CDC recommendations for household cleaning of frequently touched services.     After the initial 7 days, continue to isolate yourself from household members as much as possible. To continue decrease the " risk of community spread and exposure, you and any members of your household should limit activities in public for 14 days after starting home isolation.     You can reference the following CDC link for helpful home isolation/care tips:  https://www.cdc.gov/coronavirus/2019-ncov/downloads/10Things.pdf    Protect Others:    Cover Your Mouth and Nose with a mask, disposable tissue or wash cloth to avoid spreading germs to others.    Wash your hands and face frequently with soap and water    Call Back If: Breathing difficulty develops or you become worse.    For more information about COVID19 and options for caring for yourself at home, please visit the CDC website at https://www.cdc.gov/coronavirus/2019-ncov/about/steps-when-sick.html  For more options for care at [facility], please visit our website at [email or web address]    Additional Information    Negative: Difficult to awaken or acting confused (e.g., disoriented, slurred speech)    Negative: Pale cold skin and very weak (can't stand)    Negative: Difficulty breathing and bluish (or gray) lips or face    Negative: New onset rash with purple (or blood-colored) spots or dots    Negative: Sounds like a life-threatening emergency to the triager    Negative: Fever onset within 24 hours of receiving vaccine    Negative: Fever within 21 days of Ebola EXPOSURE    Negative: Headache and stiff neck (can't touch chin to chest)    Negative: Difficulty breathing    Negative: IV drug abuse    Negative: Fever > 103 F (39.4 C)    Negative: Fever > 101 F (38.3 C) and over 60 years of age    Negative: Fever > 100.0 F (37.8 C) and diabetes mellitus or a weak immune system (e.g., HIV positive, chemotherapy, splenectomy)    Negative: Fever > 100.0 F (37.8 C) and bedridden (e.g., nursing home patient, stroke, chronic illness, recovering from surgery)    Negative: Fever > 100.0 F (37.8 C) and indwelling urinary catheter (e.g., Rodríguez, coude)     Negative: Fever > 100.5 F (38.1 C) and has port (portacath), central line, or PICC line    Negative: Drinking very little and has signs of dehydration (e.g., no urine > 12 hours, very dry mouth, very lightheaded)    Negative: Patient sounds very sick or weak to the triager    Negative: Fever > 100.5 F (38.1 C) and surgery in the past month    Negative: Transplant patient (e.g., liver, heart, lung, kidney)    Negative: Widespread rash and cause unknown    Negative: Patient wants to be seen    Negative: Fever present > 3 days (72 hours)    Negative: Intermittent fever > 100.5 F persists > 3 weeks    Negative: Fever > 100.0 F (37.8 C) and foreign travel to a developing country in the past month    Fever with no signs of serious infection or localizing symptoms    Protocols used: FEVER-A-OH

## 2020-03-20 NOTE — TELEPHONE ENCOUNTER
States she has symptoms for a fever of 99.3 and congestion, she was told to quarantine herself but is wanting to just discuss it with a nurse.

## 2020-05-23 DIAGNOSIS — K21.9 GASTROESOPHAGEAL REFLUX DISEASE, ESOPHAGITIS PRESENCE NOT SPECIFIED: ICD-10-CM

## 2020-05-26 RX ORDER — LANSOPRAZOLE 30 MG/1
CAPSULE, DELAYED RELEASE ORAL
Qty: 90 CAPSULE | Refills: 0 | Status: SHIPPED | OUTPATIENT
Start: 2020-05-26 | End: 2020-09-09

## 2020-05-26 NOTE — TELEPHONE ENCOUNTER
"Requested Prescriptions   Pending Prescriptions Disp Refills     LANsoprazole (PREVACID) 30 MG DR capsule [Pharmacy Med Name: LANSOPRAZOLE 30MG DR CAPSULES] 90 capsule 0     Sig: TAKE 1 CAPSULE BY MOUTH EVERY DAY       PPI Protocol Passed - 5/23/2020  1:56 PM        Passed - Not on Clopidogrel (unless Pantoprazole ordered)        Passed - No diagnosis of osteoporosis on record        Passed - Recent (12 mo) or future (30 days) visit within the authorizing provider's specialty     Patient has had an office visit with the authorizing provider or a provider within the authorizing providers department within the previous 12 mos or has a future within next 30 days. See \"Patient Info\" tab in inbasket, or \"Choose Columns\" in Meds & Orders section of the refill encounter.              Passed - Medication is active on med list        Passed - Patient is age 18 or older        Passed - No active pregnacy on record        Passed - No positive pregnancy test in past 12 months           lov 08/29/19  Prescription approved per [facility] Refill Protocol.    "

## 2020-09-05 DIAGNOSIS — K21.9 GASTROESOPHAGEAL REFLUX DISEASE, ESOPHAGITIS PRESENCE NOT SPECIFIED: Primary | ICD-10-CM

## 2020-09-09 RX ORDER — LANSOPRAZOLE 30 MG/1
CAPSULE, DELAYED RELEASE ORAL
Qty: 30 CAPSULE | Refills: 0 | Status: SHIPPED | OUTPATIENT
Start: 2020-09-09 | End: 2020-10-13

## 2020-09-09 NOTE — TELEPHONE ENCOUNTER
[facility] sent Rx request for the following:      Requested Prescriptions   Pending Prescriptions Disp Refills   LANsoprazole (PREVACID) 30 MG DR capsule [Pharmacy Med Name: LANSOPRAZOLE 30MG DR CAPSULES] 90 capsule 0    Sig: TAKE 1 CAPSULE BY MOUTH EVERY DAY   Last Prescription Date:   5/26/20  Last Fill Qty/Refills:         90, R-0    Last Office Visit:                  8/29/19 (cough, MBL)    3/15/18 (physical, Previous PCP Alison Sheehan)  Future Office visit:           None     Patient is overdue for annual exam. In the absence of Alison Sheehan NP, it is recommended that Patient call to schedule appointment with one of our other providers to discuss your medications and medication refills. At the same time, Pt can discuss you might intend on establishing care with.    Attempted reaching Pt with the above information. Left message on machine to call back. Wendy Mack RN .............. 9/9/2020  10:45 AM

## 2020-09-09 NOTE — TELEPHONE ENCOUNTER
Called and spoke to Patient after verifying last name and date of birth. Pt reminded of the information below. Pt transferred to scheduling, to set up appointment to establish care/annual physical:    Next 5 appointments (look out 90 days)    Sep 14, 2020  2:00 PM CDT  PHYSICAL with Meri Segura,   [facility] [address]  [place] [address]  [phone]        Prescription approved per [facility] Refill Protocol for limited supply, to prevent break in medication. Wendy Mack RN .............. 9/9/2020  12:40 PM

## 2020-09-14 ENCOUNTER — OFFICE VISIT (OUTPATIENT)
Dept: FAMILY MEDICINE | Facility: OTHER | Age: 35
End: 2020-09-14
Attending: FAMILY MEDICINE
Payer: COMMERCIAL

## 2020-09-14 VITALS
RESPIRATION RATE: 16 BRPM | BODY MASS INDEX: 29.34 KG/M2 | HEIGHT: 63 IN | WEIGHT: 165.6 LBS | TEMPERATURE: 99 F | SYSTOLIC BLOOD PRESSURE: 124 MMHG | OXYGEN SATURATION: 97 % | HEART RATE: 65 BPM | DIASTOLIC BLOOD PRESSURE: 80 MMHG

## 2020-09-14 DIAGNOSIS — Z00.00 ANNUAL PHYSICAL EXAM: Primary | ICD-10-CM

## 2020-09-14 DIAGNOSIS — K21.9 GASTROESOPHAGEAL REFLUX DISEASE, ESOPHAGITIS PRESENCE NOT SPECIFIED: ICD-10-CM

## 2020-09-14 DIAGNOSIS — Z23 ENCOUNTER FOR IMMUNIZATION: ICD-10-CM

## 2020-09-14 DIAGNOSIS — H93.A9 PULSATILE TINNITUS: ICD-10-CM

## 2020-09-14 DIAGNOSIS — Z72.0 TOBACCO ABUSE: ICD-10-CM

## 2020-09-14 PROCEDURE — 90715 TDAP VACCINE 7 YRS/> IM: CPT | Performed by: FAMILY MEDICINE

## 2020-09-14 PROCEDURE — 90471 IMMUNIZATION ADMIN: CPT | Performed by: FAMILY MEDICINE

## 2020-09-14 PROCEDURE — 99395 PREV VISIT EST AGE 18-39: CPT | Mod: 25 | Performed by: FAMILY MEDICINE

## 2020-09-14 RX ORDER — NICOTINE 21 MG/24HR
1 PATCH, TRANSDERMAL 24 HOURS TRANSDERMAL EVERY 24 HOURS
Qty: 28 PATCH | Refills: 0 | Status: SHIPPED | OUTPATIENT
Start: 2020-09-14

## 2020-09-14 ASSESSMENT — MIFFLIN-ST. JEOR: SCORE: 1407.35

## 2020-09-14 ASSESSMENT — PAIN SCALES - GENERAL: PAINLEVEL: NO PAIN (0)

## 2020-09-14 NOTE — PROGRESS NOTES
" SUBJECTIVE:   CC: Hansa Dennis is an 35 year old woman who presents for preventive health visit.     Healthy Habits:    Do you get at least three servings of calcium containing foods daily (dairy, green leafy vegetables, etc.)? yes    Amount of exercise or daily activities, outside of work: 7 day(s) per week    Problems taking medications regularly No    Medication side effects: No    Have you had an eye exam in the past two years? no    Do you see a dentist twice per year? no    Do you have sleep apnea, excessive snoring or daytime drowsiness?no    Right Ear:  Reports that she can hear her heart beat in her right ear.  Comes and goes but seems to be becoming more constant.  Has had evenings where it wakes her up.  Sometimes associated with a \"head rush\" feeling.  Can sometimes move her head and resolve symptoms.  Just had a hearing test at work which had not significantly changed from previous.  No buzzing or ringing.  No vertigo.  Seems to be worse when she is stressed.    Tobacco Abuse:  Current smoker for past two years after previously quitting in .  She would like to try and quit again.      GERD:  Currently managed on Lansoprazole which she reports taking about every other day.  Symptoms are exacerbated by acidic foods and alcohol.      Today's PHQ-2 Score:   PHQ-2 (  [employer]) 2020   Q1: Little interest or pleasure in doing things 0   Q2: Feeling down, depressed or hopeless 0   PHQ-2 Score 0     Abuse: Current or Past(Physical, Sexual or Emotional)- No  Do you feel safe in your environment? Yes    Social History     Tobacco Use     Smoking status: Former Smoker     Packs/day: 0.50     Years: 10.00     Pack years: 5.00     Types: Cigarettes     Start date: 1998     Last attempt to quit: 2011     Years since quittin.2     Smokeless tobacco: Never Used   Substance Use Topics     Alcohol use: Yes     Alcohol/week: 0.0 standard drinks     If you drink alcohol do you typically have >3 " drinks per day or >7 drinks per week? No                     Reviewed orders with patient.  Reviewed health maintenance and updated orders accordingly - Yes    Mammogram not appropriate for this patient based on age.    Pertinent mammograms are reviewed under the imaging tab.  History of abnormal Pap smear: NO - age 30-65 PAP every 5 years with negative HPV co-testing recommended  PAP / HPV Latest Ref Rng & Units 3/15/2018   HPV 16 DNA NEG:Negative Negative   HPV 18 DNA NEG:Negative Negative   OTHER HR HPV NEG:Negative Negative     Reviewed and updated as needed this visit by clinical staff  Tobacco  Allergies  Meds  Problems  Med Hx  Surg Hx  Fam Hx  Soc Hx        Reviewed and updated as needed this visit by Provider        Past Medical History:   Diagnosis Date     Abnormal cytological finding in specimen from cervix uteri     ,treated with cryo, all normal since     Encounter for screening for malignant neoplasm of cervix     2015,PAP NIL (no HPV done d/t age), repeat due       Past Surgical History:   Procedure Laterality Date     COLONOSCOPY      ,repeat at age 40     OTHER SURGICAL HISTORY      81062.0,PAST SURGICAL HISTORY,Jaw repair~02 Colposcopy ~05 Colposcopy, showing squamous dysplasia, CIN1~05 Cryotherapy of the cervix.     OB History    Para Term  AB Living   2 2 0 0 0 0   SAB TAB Ectopic Multiple Live Births   0 0 0 0 0     ROS:  CONSTITUTIONAL: NEGATIVE for fever, chills, change in weight  INTEGUMENTARU/SKIN: NEGATIVE for worrisome rashes, moles or lesions  EYES: NEGATIVE for vision changes or irritation  ENT: NEGATIVE for ear, mouth and throat problems  RESP: NEGATIVE for significant cough or SOB  CV: NEGATIVE for chest pain, palpitations or peripheral edema  GI: NEGATIVE for nausea, abdominal pain, heartburn, or change in bowel habits  : NEGATIVE for unusual urinary or vaginal symptoms. Periods are regular.  MUSCULOSKELETAL: NEGATIVE for  "significant arthralgias or myalgia  NEURO: NEGATIVE for weakness, dizziness or paresthesias  PSYCHIATRIC: NEGATIVE for changes in mood or affect    OBJECTIVE:   /80   Pulse 65   Temp 99  F (37.2  C) (Tympanic)   Resp 16   Ht 1.588 m (5' 2.5\")   Wt 75.1 kg (165 lb 9.6 oz)   LMP 09/06/2020   SpO2 97%   BMI 29.81 kg/m    EXAM:  GENERAL: healthy, alert and no distress  EYES: Eyes grossly normal to inspection, PERRL and conjunctivae and sclerae normal  HENT: ear canals and TM's normal, nose and mouth without ulcers or lesions  NECK: no adenopathy, no asymmetry, masses, or scars and thyroid normal to palpation  RESP: lungs clear to auscultation - no rales, rhonchi or wheezes  CV: regular rate and rhythm, normal S1 S2, no S3 or S4, no murmur, click or rub, no peripheral edema and peripheral pulses strong  ABDOMEN: soft, nontender, no hepatosplenomegaly, no masses and bowel sounds normal  MS: no gross musculoskeletal defects noted, no edema  SKIN: no suspicious lesions or rashes  NEURO: Normal strength and tone, mentation intact and speech normal  PSYCH: mentation appears normal, affect normal/bright    Diagnostic Test Results:  Labs reviewed in Epic    ASSESSMENT/PLAN:   1. Annual physical exam  No daily ASA indicated.  BP at goal < 130/80.  Breast and colon cancer screening not indicated.  Cervical cancer screening up-to-date.  Immunizations updated today.  No depression.  Counseled on healthy diet and exercise.    2. Pulsatile tinnitus  Normal ear exam.  Recently had hearing screen performed at work and reports normal results.  Recommend she check her blood pressure daily and when symptomatic and keep log for review on follow-up.  Plan to reassess then.    3. Gastroesophageal reflux disease, esophagitis presence not specified  Well-controlled on current medication.    4. Tobacco abuse  Would like to quit.  Start Nicotine patches.  - nicotine (NICODERM CQ) 14 MG/24HR 24 hr patch; Place 1 patch onto the skin " "every 24 hours  Dispense: 28 patch; Refill: 0    5. Encounter for immunization  - VACCINE ADMINISTRATION, INITIAL  - TDAP VACCINE    COUNSELING:   Reviewed preventive health counseling, as reflected in patient instructions       Regular exercise       Healthy diet/nutrition       Vision screening       Hearing screening       Immunizations       Osteoporosis Prevention/Bone Health       Colon cancer screening    Estimated body mass index is 29.81 kg/m  as calculated from the following:    Height as of this encounter: 1.588 m (5' 2.5\").    Weight as of this encounter: 75.1 kg (165 lb 9.6 oz).    Weight management plan: Discussed healthy diet and exercise guidelines    She reports that she quit smoking about 9 years ago. Her smoking use included cigarettes. She started smoking about 22 years ago. She has a 5.00 pack-year smoking history. She has never used smokeless tobacco.    Counseling Resources:  ATP IV Guidelines  Pooled Cohorts Equation Calculator  Breast Cancer Risk Calculator  BRCA-Related Cancer Risk Assessment: FHS-7 Tool  FRAX Risk Assessment  ICSI Preventive Guidelines  Dietary Guidelines for Americans, 2010  USDA's MyPlate  ASA Prophylaxis  Lung CA Screening    Meri Segura DO  [facility]  "

## 2020-09-14 NOTE — NURSING NOTE
"Chief Complaint   Patient presents with     Physical     Establish Care         Initial /80   Pulse 65   Temp 99  F (37.2  C) (Tympanic)   Resp 16   Ht 1.588 m (5' 2.5\")   Wt 75.1 kg (165 lb 9.6 oz)   LMP 09/06/2020   SpO2 97%   BMI 29.81 kg/m   Estimated body mass index is 29.81 kg/m  as calculated from the following:    Height as of this encounter: 1.588 m (5' 2.5\").    Weight as of this encounter: 75.1 kg (165 lb 9.6 oz).    Medication Reconciliation: complete      Norma J. Gosselin, LPN  "

## 2020-10-13 DIAGNOSIS — K21.9 GASTROESOPHAGEAL REFLUX DISEASE: ICD-10-CM

## 2020-10-13 RX ORDER — LANSOPRAZOLE 30 MG/1
CAPSULE, DELAYED RELEASE ORAL
Qty: 30 CAPSULE | Refills: 11 | Status: SHIPPED | OUTPATIENT
Start: 2020-10-13

## 2020-10-13 NOTE — TELEPHONE ENCOUNTER
"Requested Prescriptions   Pending Prescriptions Disp Refills     LANsoprazole (PREVACID) 30 MG DR capsule [Pharmacy Med Name: LANSOPRAZOLE 30MG DR CAPSULES] 30 capsule 0     Sig: TAKE 1 CAPSULE BY MOUTH EVERY DAY       PPI Protocol Passed - 10/13/2020 11:23 AM        Passed - Not on Clopidogrel (unless Pantoprazole ordered)        Passed - No diagnosis of osteoporosis on record        Passed - Recent (12 mo) or future (30 days) visit within the authorizing provider's specialty     Patient has had an office visit with the authorizing provider or a provider within the authorizing providers department within the previous 12 mos or has a future within next 30 days. See \"Patient Info\" tab in inbasket, or \"Choose Columns\" in Meds & Orders section of the refill encounter.              Passed - Medication is active on med list        Passed - Patient is age 18 or older        Passed - No active pregnacy on record        Passed - No positive pregnancy test in past 12 months           lov 09/14/2020  Prescription [place] per [facility] [place].    "

## 2020-10-26 ENCOUNTER — HOSPITAL ENCOUNTER (OUTPATIENT)
Dept: MRI IMAGING | Facility: OTHER | Age: 35
Discharge: HOME | End: 2020-10-26
Attending: NURSE PRACTITIONER | Admitting: FAMILY MEDICINE
Payer: COMMERCIAL

## 2020-10-26 DIAGNOSIS — R93.5 ABNORMAL ABDOMINAL ULTRASOUND: ICD-10-CM

## 2020-10-26 DIAGNOSIS — R10.84 GENERALIZED ABDOMINAL PAIN: ICD-10-CM

## 2020-10-26 DIAGNOSIS — K76.0 FATTY LIVER: ICD-10-CM

## 2020-10-26 PROCEDURE — A9575 INJ GADOTERATE MEGLUMI 0.1ML: HCPCS | Performed by: RADIOLOGY

## 2020-10-26 PROCEDURE — 255N000002 HC RX 255 OP 636: Performed by: RADIOLOGY

## 2020-10-26 PROCEDURE — 74183 MRI ABD W/O CNTR FLWD CNTR: CPT

## 2020-10-26 RX ADMIN — GADOTERATE MEGLUMINE 15 ML: 376.9 INJECTION INTRAVENOUS at 15:55

## 2020-12-27 ENCOUNTER — HEALTH MAINTENANCE LETTER (OUTPATIENT)
Age: 35
End: 2020-12-27

## 2021-10-09 ENCOUNTER — HEALTH MAINTENANCE LETTER (OUTPATIENT)
Age: 36
End: 2021-10-09

## 2021-12-04 ENCOUNTER — HEALTH MAINTENANCE LETTER (OUTPATIENT)
Age: 36
End: 2021-12-04

## 2022-09-17 ENCOUNTER — HEALTH MAINTENANCE LETTER (OUTPATIENT)
Age: 37
End: 2022-09-17

## 2023-01-28 ENCOUNTER — HEALTH MAINTENANCE LETTER (OUTPATIENT)
Age: 38
End: 2023-01-28

## 2024-02-24 ENCOUNTER — HEALTH MAINTENANCE LETTER (OUTPATIENT)
Age: 39
End: 2024-02-24